# Patient Record
(demographics unavailable — no encounter records)

---

## 2025-01-18 NOTE — HISTORY OF PRESENT ILLNESS
[4] : 4 [Localized] : localized [Sleep] : sleep [Rest] : rest [Lying in bed] : lying in bed [] : no [FreeTextEntry5] : 70 y/o F here for follow up on the right knee. CSI from previous visit provided good relief. Looking to get another injection today. [de-identified] : MRI

## 2025-01-18 NOTE — ASSESSMENT
[FreeTextEntry1] : The patient is here for right knee pain. She is here to consider a second CSI prior to discussing arthroscopic surgery,  She reports that her pain increased and remained since that time. She has pain with walking distances. She has pain with stairs and going from sitting to standing. She has increased pain at night. She cannot use NSAIDs.   Right knee exam: Well appearing female in no apparent distress. No rashes, scars, or abrasions. Neurovascularly intact. Tender to palpation at medial joint line. Mild varus deformity. Ranging from 0-120. Anterior/posterior drawer negative, + Mcmurrays. - Lachmans. Screening exam of the right hip shows a full, painless ROM.  Under sterile conditions the right knee was injected with 5 cc of quarter percent plain Marcaine; 5 cc of 2% plain lidocaine and 80 mg of Depo-Medrol.  Patient tolerated the procedure well.  The patient received a right knee CSI. She tolerated it well. She will return as needed. If not better we will consider a right knee arthroscopy.

## 2025-01-18 NOTE — HISTORY OF PRESENT ILLNESS
[4] : 4 [Localized] : localized [Sleep] : sleep [Rest] : rest [Lying in bed] : lying in bed [] : no [FreeTextEntry5] : 68 y/o F here for follow up on the right knee. CSI from previous visit provided good relief. Looking to get another injection today. [de-identified] : MRI

## 2025-01-18 NOTE — HISTORY OF PRESENT ILLNESS
[4] : 4 [Localized] : localized [Sleep] : sleep [Rest] : rest [Lying in bed] : lying in bed [] : no [FreeTextEntry5] : 68 y/o F here for follow up on the right knee. CSI from previous visit provided good relief. Looking to get another injection today. [de-identified] : MRI